# Patient Record
Sex: FEMALE | Race: WHITE | NOT HISPANIC OR LATINO | URBAN - METROPOLITAN AREA
[De-identification: names, ages, dates, MRNs, and addresses within clinical notes are randomized per-mention and may not be internally consistent; named-entity substitution may affect disease eponyms.]

---

## 2022-11-03 ENCOUNTER — OFFICE VISIT (OUTPATIENT)
Dept: URGENT CARE | Facility: CLINIC | Age: 27
End: 2022-11-03

## 2022-11-03 VITALS
DIASTOLIC BLOOD PRESSURE: 79 MMHG | OXYGEN SATURATION: 97 % | BODY MASS INDEX: 23.83 KG/M2 | TEMPERATURE: 98 F | WEIGHT: 122 LBS | RESPIRATION RATE: 18 BRPM | HEART RATE: 96 BPM | SYSTOLIC BLOOD PRESSURE: 132 MMHG

## 2022-11-03 DIAGNOSIS — R00.2 PALPITATIONS: Primary | ICD-10-CM

## 2022-11-03 NOTE — PROGRESS NOTES
[facility]        NAME: Atif Mccabe is a 27 y o  female  : 1995    MRN: [id number]  DATE: November 3, 2022  TIME: 5:37 PM    Assessment and Plan   Palpitations [R00 2]  1  Palpitations  ECG 12 lead     ECG normal   Palpitation and chest discomfort may be anxiety induced  Unable to rule out hiatal hernia or other potential causes  Return precautions given  Patient encouraged to try and see if taking a Xanax would improve her symptoms  Patient Instructions     Follow up with PCP in 3-5 days  Proceed to  ER if symptoms worsen  Chief Complaint     Chief Complaint   Patient presents with   • Chest Pain     Episodes x 2 week, intermittent chest pain, palpitations, left arm numbness/tingling  Left side chest pain, then center of chest,and back   Denies n/v, sob  History of Present Illness     61-year-old female presents today with 2 weeks of chest discomfort associated with left upper extremity discomfort  Specifically reports intermittent palpitations versus chest pain located in the central and left chest   Would develope paresthesias in the left hand and axilla  Experienced this once before about 1 year ago which spontaneously resolved after about 1 month  Reports that her  had a similar case about 1 month ago prior to the onset of her current symptoms  Denies any obvious fevers, chills, respiratory symptoms, abdominal symptoms, dizziness or headaches of the usual   Is under the care of a holistic medical provider regarding her chronic Lyme and EBV infections  Review of Systems   Review of Systems   Constitutional: Negative for chills and fever  Respiratory: Positive for chest tightness  Negative for cough, shortness of breath and wheezing  Cardiovascular: Positive for chest pain  Gastrointestinal: Negative for abdominal pain, diarrhea and nausea  Allergic/Immunologic: Positive for environmental allergies     Neurological: Positive for weakness and numbness  Negative for dizziness and headaches  Psychiatric/Behavioral: The patient is nervous/anxious  Current Medications       Current Outpatient Medications:   •  fluticasone (VERAMYST) 27 5 MCG/SPRAY nasal spray, 2 sprays into each nostril daily, Disp: , Rfl:   •  levocetirizine (XYZAL) 5 MG tablet, Take 5 mg by mouth every evening, Disp: , Rfl:   •  montelukast (SINGULAIR) 10 mg tablet, Take 1 tablet (10 mg total) by mouth daily at bedtime, Disp: 30 tablet, Rfl: 3    Current Allergies     Allergies as of 11/03/2022 - Reviewed 11/03/2022   Allergen Reaction Noted   • Cetirizine Edema, Hives, and Other (See Comments) 03/20/2021            The following portions of the patient's history were reviewed and updated as appropriate: allergies, current medications, past family history, past medical history, past social history, past surgical history and problem list      Past Medical History:   Diagnosis Date   • Personal history of allergy to analgesic agent        Past Surgical History:   Procedure Laterality Date   • BREAST IMPLANT     • BREAST IMPLANT REMOVAL  2020       Family History   Problem Relation Age of Onset   • No Known Problems Mother    • No Known Problems Father          Medications have been verified  Objective   /79   Pulse 96   Temp 98 °F (36 7 °C)   Resp 18   Wt 55 3 kg (122 lb)   LMP 09/30/2022   SpO2 97%   BMI 23 83 kg/m²   Patient's last menstrual period was 09/30/2022  Physical Exam     Physical Exam  Vitals and nursing note reviewed  Constitutional:       General: She is in acute distress  Appearance: Normal appearance  She is well-developed and normal weight  She is not ill-appearing, toxic-appearing or diaphoretic  HENT:      Head: Normocephalic and atraumatic  Eyes:      General:         Right eye: No discharge  Left eye: No discharge  Conjunctiva/sclera: Conjunctivae normal    Cardiovascular:      Heart sounds: Normal heart sounds  Heart sounds not distant  No murmur heard  No systolic murmur is present  No friction rub  Pulmonary:      Effort: Pulmonary effort is normal  No respiratory distress  Breath sounds: Normal breath sounds  No decreased breath sounds, wheezing, rhonchi or rales  Abdominal:      General: Abdomen is flat  Skin:     General: Skin is warm  Findings: No erythema  Neurological:      General: No focal deficit present  Mental Status: She is alert and oriented to person, place, and time  Psychiatric:         Mood and Affect: Mood normal          Behavior: Behavior normal          Thought Content:  Thought content normal          Judgment: Judgment normal

## 2022-11-04 LAB
ATRIAL RATE: 82 BPM
P AXIS: 65 DEGREES
PR INTERVAL: 144 MS
QRS AXIS: 70 DEGREES
QRSD INTERVAL: 70 MS
QT INTERVAL: 354 MS
QTC INTERVAL: 413 MS
T WAVE AXIS: 44 DEGREES
VENTRICULAR RATE: 82 BPM

## 2024-03-13 ENCOUNTER — TELEPHONE (OUTPATIENT)
Age: 29
End: 2024-03-13

## 2024-03-13 NOTE — TELEPHONE ENCOUNTER
PT would like to know if she is able to receive the Tdap vaccine at her appointment on Monday.     Please give her a call back

## 2024-03-18 ENCOUNTER — OFFICE VISIT (OUTPATIENT)
Dept: FAMILY MEDICINE CLINIC | Facility: CLINIC | Age: 29
End: 2024-03-18
Payer: COMMERCIAL

## 2024-03-18 VITALS
BODY MASS INDEX: 30.43 KG/M2 | WEIGHT: 155 LBS | DIASTOLIC BLOOD PRESSURE: 60 MMHG | SYSTOLIC BLOOD PRESSURE: 120 MMHG | HEART RATE: 92 BPM | HEIGHT: 60 IN | OXYGEN SATURATION: 99 % | TEMPERATURE: 98.3 F | RESPIRATION RATE: 16 BRPM

## 2024-03-18 DIAGNOSIS — Z00.00 PHYSICAL EXAM: Primary | ICD-10-CM

## 2024-03-18 DIAGNOSIS — Z23 NEED FOR TDAP VACCINATION: ICD-10-CM

## 2024-03-18 DIAGNOSIS — Z23 ENCOUNTER FOR IMMUNIZATION: ICD-10-CM

## 2024-03-18 DIAGNOSIS — Z34.93 PREGNANT AND NOT YET DELIVERED, THIRD TRIMESTER: ICD-10-CM

## 2024-03-18 PROCEDURE — 90715 TDAP VACCINE 7 YRS/> IM: CPT

## 2024-03-18 PROCEDURE — 99385 PREV VISIT NEW AGE 18-39: CPT | Performed by: FAMILY MEDICINE

## 2024-03-18 PROCEDURE — 90471 IMMUNIZATION ADMIN: CPT

## 2024-03-18 RX ORDER — PNV NO.95/FERROUS FUM/FOLIC AC 28MG-0.8MG
TABLET ORAL
COMMUNITY

## 2024-03-18 NOTE — PROGRESS NOTES
[facility] HEALTH MAINTENANCE OFFICE VISIT  [facility] - [facility]    NAME: Kaylene Garner  AGE: 28 y.o. SEX: female  : 1995     DATE: 3/19/2024    Assessment and Plan     1. Physical exam    2. Pregnant and not yet delivered, third trimester    3. BMI 30.0-30.9,adult    4. Need for Tdap vaccination    5. Encounter for immunization  -     TDAP VACCINE GREATER THAN OR EQUAL TO 6YO IM      Patient Counseling:   Nutrition: Stressed importance of a well balanced diet, moderation of sodium/saturated fat, caloric balance and sufficient intake of fiber  Exercise: Stressed the importance of regular exercise with a goal of 150 minutes per week  Dental Health: Discussed daily flossing and brushing and regular dental visits   Injury Prevention: Discussed Safety Belts, Safety Helmets, and Smoke Detectors    Immunizations reviewed: Risks and Benefits discussed  Discussed benefits of:  Screening labs.  BMI Counseling: Body mass index is 30.27 kg/m².   Pt presently in third trimester pregnancy.    Chief Complaint     Chief Complaint   Patient presents with   • Physical Exam     Tdap vaccine       History of Present Illness     HPI    Well Adult Physical   Patient here for a comprehensive physical exam.  NP in to get established with practice.  In third trimester pregnancy-under care of [facility]--Jerrod Jacob and Traci Gomez--[facility]-+#[phone]  Will be delivering at [facility].  Uses [facility]  Prior physician-functional doctor  Ref to practice by friend    Diet and Physical Activity  Diet: well balanced diet  Exercise: several times per week      Depression Screen  PHQ-2/9 Depression Screening    Little interest or pleasure in doing things: 0 - not at all  Feeling down, depressed, or hopeless: 0 - not at all          General Health  Hearing: Normal:  bilateral  Vision: goes for regular eye exams  Dental: regular dental visits    Reproductive  Health        The following portions of the patient's history were reviewed and updated as appropriate: allergies, current medications, past family history, past medical history, past social history, past surgical history and problem list.    Review of Systems     Review of Systems   Constitutional:  Positive for fatigue.   Respiratory: Negative.     Cardiovascular: Negative.    Gastrointestinal: Negative.    Musculoskeletal: Negative.    Neurological: Negative.    Psychiatric/Behavioral: Negative.         Past Medical History     Past Medical History:   Diagnosis Date   • Personal history of allergy to analgesic agent        Past Surgical History     Past Surgical History:   Procedure Laterality Date   • BREAST IMPLANT     • BREAST IMPLANT REMOVAL         Social History           Family History     Family History   Problem Relation Age of Onset   • No Known Problems Mother    • Heart disease Father    • Dementia Maternal Grandmother    • Cancer Maternal Aunt         ovarian       Current Medications       Current Outpatient Medications:   •  Prenatal Vit-Fe Fumarate-FA (Prenatal Vitamin) 27-0.8 MG TABS, Take by mouth, Disp: , Rfl:      Allergies     Allergies   Allergen Reactions   • Cetirizine Hives and Edema       Objective     /60 (BP Location: Left arm, Patient Position: Sitting, Cuff Size: Standard)   Pulse 92   Temp 98.3 °F (36.8 °C) (Temporal)   Resp 16   Ht 5' (1.524 m)   Wt 70.3 kg (155 lb)   SpO2 99%   BMI 30.27 kg/m²      Physical Exam  Vitals and nursing note reviewed.   Constitutional:       General: She is not in acute distress.     Comments: Pregnant   HENT:      Nose: Nose normal.   Eyes:      General: No scleral icterus.     Conjunctiva/sclera: Conjunctivae normal.   Cardiovascular:      Rate and Rhythm: Normal rate and regular rhythm.   Pulmonary:      Effort: Pulmonary effort is normal. No respiratory distress.      Breath sounds: Normal breath sounds.   Abdominal:       General: Bowel sounds are normal.      Palpations: Abdomen is soft.      Tenderness: There is no abdominal tenderness.   Musculoskeletal:         General: Normal range of motion.      Cervical back: Neck supple. No tenderness.      Right lower leg: No edema.      Left lower leg: No edema.   Skin:     General: Skin is warm and dry.      Coloration: Skin is not jaundiced.   Neurological:      General: No focal deficit present.      Mental Status: She is alert and oriented to person, place, and time.      Cranial Nerves: No cranial nerve deficit.   Psychiatric:         Mood and Affect: Mood normal.           Marian Kemp MD  [facility]

## 2024-03-18 NOTE — PROGRESS NOTES
[facility] HEALTH MAINTENANCE OFFICE VISIT  [facility] - [facility]    NAME: Kaylene Garner  AGE: 28 y.o. SEX: female  : 1995     DATE: 3/18/2024    Assessment and Plan     1. Physical exam    2. Pregnant and not yet delivered, third trimester    3. BMI 30.0-30.9,adult      Patient Counseling:   { Adult CPE Counseling List:75897}    Immunizations reviewed: {LK immunization CPE list:17073}  Discussed benefits of:  {LK Adult CPE Screening counselin}.  BMI Counseling: Body mass index is 30.27 kg/m². Discussed with patient's BMI with her. The BMI {VB BMI Counselin}            Chief Complaint     Chief Complaint   Patient presents with   • Physical Exam     Tdap vaccine       History of Present Illness     HPI    Well Adult Physical   Patient here for a comprehensive physical exam.      Diet and Physical Activity  Diet: {diet; well adult:63502}  Exercise: {exericse; well adult:11218}      Depression Screen  PHQ-2/9 Depression Screening    Little interest or pleasure in doing things: 0 - not at all  Feeling down, depressed, or hopeless: 0 - not at all          General Health  Hearing: {WELL ADULT HEARIN}  Vision: {vision; well adult:05331}  Dental: {dental; well adult:93255}    Reproductive Health  {LK Adult CPE Screening counselin}      The following portions of the patient's history were reviewed and updated as appropriate: allergies, current medications, past family history, past medical history, past social history, past surgical history and problem list.    Review of Systems     Review of Systems    Past Medical History     Past Medical History:   Diagnosis Date   • Personal history of allergy to analgesic agent        Past Surgical History     Past Surgical History:   Procedure Laterality Date   • BREAST IMPLANT     • BREAST IMPLANT REMOVAL         Social History     Social History     Socioeconomic History   • Marital status:  /Civil Union     Spouse name: None   • Number of children: None   • Years of education: None   • Highest education level: None   Occupational History   • None   Tobacco Use   • Smoking status: Never   • Smokeless tobacco: Never   Vaping Use   • Vaping status: Never Used   Substance and Sexual Activity   • Alcohol use: Not Currently   • Drug use: Never   • Sexual activity: Yes   Other Topics Concern   • None   Social History Narrative        What type of home do you live in: Single house    Age of your home: 48    Type of heat: Oil    What type of eliana is in your bedroom: Hardwood floor    Air products: Central air and Air filter    Pets: Dog and Cat    Symptoms caused by pets: itchy eyes    Are pets allowed in bedroom: No    Basement: Damp    Mold:no    Mold location: ***    Mattress covered by allergy cover: no    Pillows covered by allergy cover: yes    Linens and bedding washed in hot water: yes    Exposure to second hand smoke: No        Habits:    Caffeine: {Type:02689}; Amount: cups/day ***, #years ***    Chocolate: ***    Other:     Social Determinants of Health     Financial Resource Strain: Not on file   Food Insecurity: Not on file   Transportation Needs: Not on file   Physical Activity: Not on file   Stress: Not on file   Social Connections: Not on file   Intimate Partner Violence: Not on file   Housing Stability: Not on file       Family History     Family History   Problem Relation Age of Onset   • No Known Problems Mother    • Heart disease Father    • Dementia Maternal Grandmother    • Cancer Maternal Aunt         ovarian       Current Medications       Current Outpatient Medications:   •  Prenatal Vit-Fe Fumarate-FA (Prenatal Vitamin) 27-0.8 MG TABS, Take by mouth, Disp: , Rfl:      Allergies     Allergies   Allergen Reactions   • Cetirizine Hives and Edema       Objective     /60 (BP Location: Left arm, Patient Position: Sitting, Cuff Size: Standard)   Pulse 92   Temp 98.3 °F  (36.8 °C) (Temporal)   Resp 16   Ht 5' (1.524 m)   Wt 70.3 kg (155 lb)   SpO2 99%   BMI 30.27 kg/m²      Physical Exam      No results found.        Marian Kemp MD  [facility]

## 2024-03-18 NOTE — PROGRESS NOTES
[facility] HEALTH MAINTENANCE OFFICE VISIT  [facility] - [facility]    NAME: Kaylene Garner  AGE: 28 y.o. SEX: female  : 1995     DATE: 3/18/2024    Assessment and Plan     1. Physical exam    2. Pregnant and not yet delivered, third trimester    3. BMI 30.0-30.9,adult      Patient Counseling:   { Adult CPE Counseling List:65550}    Immunizations reviewed: {LK immunization CPE list:99358}  Discussed benefits of:  {LK Adult CPE Screening counselin}.  BMI Counseling: Body mass index is 30.27 kg/m². Discussed with patient's BMI with her. The BMI {VB BMI Counselin}    No follow-ups on file.        Chief Complaint     Chief Complaint   Patient presents with   • Physical Exam     Tdap vaccine       History of Present Illness     HPI    Well Adult Physical   Patient here for a comprehensive physical exam.      Diet and Physical Activity  Diet: {diet; well adult:85533}  Exercise: {exericse; well adult:46905}      Depression Screen  PHQ-2/9 Depression Screening    Little interest or pleasure in doing things: 0 - not at all  Feeling down, depressed, or hopeless: 0 - not at all          General Health  Hearing: {WELL ADULT HEARIN}  Vision: {vision; well adult:63227}  Dental: {dental; well adult:43705}    Reproductive Health  {LK Adult CPE Screening counselin}      The following portions of the patient's history were reviewed and updated as appropriate: allergies, current medications, past family history, past medical history, past social history, past surgical history and problem list.    Review of Systems     Review of Systems    Past Medical History     Past Medical History:   Diagnosis Date   • Personal history of allergy to analgesic agent        Past Surgical History     Past Surgical History:   Procedure Laterality Date   • BREAST IMPLANT     • BREAST IMPLANT REMOVAL         Social History     Social History     Socioeconomic History   •  Marital status: /Civil Union     Spouse name: None   • Number of children: None   • Years of education: None   • Highest education level: None   Occupational History   • None   Tobacco Use   • Smoking status: Never   • Smokeless tobacco: Never   Vaping Use   • Vaping status: Never Used   Substance and Sexual Activity   • Alcohol use: Not Currently   • Drug use: Never   • Sexual activity: Yes   Other Topics Concern   • None   Social History Narrative        What type of home do you live in: Single house    Age of your home: 48    Type of heat: Oil    What type of eliana is in your bedroom: Hardwood floor    Air products: Central air and Air filter    Pets: Dog and Cat    Symptoms caused by pets: itchy eyes    Are pets allowed in bedroom: No    Basement: Damp    Mold:no    Mold location: ***    Mattress covered by allergy cover: no    Pillows covered by allergy cover: yes    Linens and bedding washed in hot water: yes    Exposure to second hand smoke: No        Habits:    Caffeine: {Type:66637}; Amount: cups/day ***, #years ***    Chocolate: ***    Other:     Social Determinants of Health     Financial Resource Strain: Not on file   Food Insecurity: Not on file   Transportation Needs: Not on file   Physical Activity: Not on file   Stress: Not on file   Social Connections: Not on file   Intimate Partner Violence: Not on file   Housing Stability: Not on file       Family History     Family History   Problem Relation Age of Onset   • No Known Problems Mother    • Heart disease Father    • Dementia Maternal Grandmother    • Cancer Maternal Aunt         ovarian       Current Medications       Current Outpatient Medications:   •  Prenatal Vit-Fe Fumarate-FA (Prenatal Vitamin) 27-0.8 MG TABS, Take by mouth, Disp: , Rfl:      Allergies     Allergies   Allergen Reactions   • Cetirizine Hives and Edema       Objective     /60 (BP Location: Left arm, Patient Position: Sitting, Cuff Size: Standard)   Pulse 92    Temp 98.3 °F (36.8 °C) (Temporal)   Resp 16   Ht 5' (1.524 m)   Wt 70.3 kg (155 lb)   SpO2 99%   BMI 30.27 kg/m²      Physical Exam      No results found.        Marian Kemp MD  [facility]

## 2024-03-19 PROBLEM — Z23 ENCOUNTER FOR IMMUNIZATION: Status: ACTIVE | Noted: 2024-03-18

## 2024-05-24 ENCOUNTER — NURSE TRIAGE (OUTPATIENT)
Age: 29
End: 2024-05-24

## 2024-05-24 ENCOUNTER — OFFICE VISIT (OUTPATIENT)
Dept: URGENT CARE | Facility: CLINIC | Age: 29
End: 2024-05-24
Payer: COMMERCIAL

## 2024-05-24 VITALS
HEIGHT: 61 IN | BODY MASS INDEX: 25.45 KG/M2 | RESPIRATION RATE: 18 BRPM | TEMPERATURE: 98.8 F | OXYGEN SATURATION: 99 % | SYSTOLIC BLOOD PRESSURE: 110 MMHG | DIASTOLIC BLOOD PRESSURE: 78 MMHG | WEIGHT: 134.8 LBS | HEART RATE: 87 BPM

## 2024-05-24 DIAGNOSIS — Z87.898 H/O LEFT FLANK PAIN: Primary | ICD-10-CM

## 2024-05-24 LAB
SL AMB  POCT GLUCOSE, UA: NEGATIVE
SL AMB LEUKOCYTE ESTERASE,UA: ABNORMAL
SL AMB POCT BILIRUBIN,UA: NEGATIVE
SL AMB POCT BLOOD,UA: ABNORMAL
SL AMB POCT CLARITY,UA: ABNORMAL
SL AMB POCT COLOR,UA: ABNORMAL
SL AMB POCT KETONES,UA: NEGATIVE
SL AMB POCT NITRITE,UA: POSITIVE
SL AMB POCT PH,UA: 7
SL AMB POCT SPECIFIC GRAVITY,UA: 1
SL AMB POCT URINE PROTEIN: ABNORMAL
SL AMB POCT UROBILINOGEN: 0.2

## 2024-05-24 PROCEDURE — 99213 OFFICE O/P EST LOW 20 MIN: CPT | Performed by: FAMILY MEDICINE

## 2024-05-24 PROCEDURE — 87086 URINE CULTURE/COLONY COUNT: CPT | Performed by: FAMILY MEDICINE

## 2024-05-24 PROCEDURE — 81002 URINALYSIS NONAUTO W/O SCOPE: CPT | Performed by: FAMILY MEDICINE

## 2024-05-24 NOTE — PROGRESS NOTES
"  [facility]        NAME: Kaylene Garner is a 28 y.o. female  : 1995    MRN: [id number]  DATE: May 24, 2024  TIME: 3:36 PM    Assessment and Plan   H/O left flank pain [Z87.898]  1. H/O left flank pain  POCT urine dip    Urine culture        Urine dip positive for moderate leukocyte esterase and nitrate though she does not have traditional UTI symptoms.  May indicate an asymptomatic bacteriuria which may have persisted since the pregnancy.  Will obtain a urine culture.  If culture positive, will prescribe an antibiotic that is breast-feeding safe.    Patient Instructions     Follow up with PCP in 3-5 days.  Proceed to  ER if symptoms worsen.    If tests have been performed at [facility], our office will contact you with results if changes need to be made to the care plan discussed with you at the visit.  You can review your full results on [facility] MyChart.    Chief Complaint     Chief Complaint   Patient presents with    Possible UTI     Has been having a \"frothiness\" after voiding in urine x several months during last trimester. Has  24. Continues post partum. Did OTC UTI test - had leokocytes but no nitrites. Has been having occ. L flank pain. Denies fever, urgency/frequency or N/V. Still having minimal post partum bleeding.          History of Present Illness       28-year-old female G1, P1, who presents today with about 2 weeks of left-sided flank pain presents intermittently.  Additionally she reports a frothiness in her urine which has become more frequent.  She is 1 month postpartum via  and denies any pregnancy complications.  Started seeing the frothiness in her third trimester.  Denies any traditional UTI symptoms such as dysuria, increased urinary frequency and urgency.  Denies any fevers or chills.  Of note, baby is doing well; she and the father doing well.  Denies any feelings of baby blues.      Review of Systems   Review of Systems   Constitutional:  " "Negative for chills and fever.   Respiratory:  Negative for shortness of breath.    Cardiovascular:  Negative for chest pain.   Gastrointestinal:  Negative for abdominal pain and nausea.   Genitourinary:  Positive for flank pain (left). Negative for dysuria, frequency and urgency.   Neurological:  Negative for dizziness and headaches.     Current Medications       Current Outpatient Medications:     Prenatal Vit-Fe Fumarate-FA (Prenatal Vitamin) 27-0.8 MG TABS, Take by mouth, Disp: , Rfl:     Current Allergies     Allergies as of 2024 - Reviewed 2024   Allergen Reaction Noted    Cetirizine Hives and Edema 2021            The following portions of the patient's history were reviewed and updated as appropriate: allergies, current medications, past family history, past medical history, past social history, past surgical history and problem list.     Past Medical History:   Diagnosis Date    Personal history of allergy to analgesic agent     Urinary tract infection        Past Surgical History:   Procedure Laterality Date    BREAST IMPLANT      BREAST IMPLANT REMOVAL       SECTION      24       Family History   Problem Relation Age of Onset    No Known Problems Mother     Heart disease Father     Dementia Maternal Grandmother     Cancer Maternal Aunt         ovarian         Medications have been verified.        Objective   /78   Pulse 87   Temp 98.8 °F (37.1 °C)   Resp 18   Ht 5' 1\" (1.549 m)   Wt 61.1 kg (134 lb 12.8 oz)   SpO2 99%   Breastfeeding Yes   BMI 25.47 kg/m²   No LMP recorded.       Physical Exam     Physical Exam  Vitals and nursing note reviewed.   Constitutional:       General: She is in acute distress.      Appearance: Normal appearance. She is normal weight. She is not ill-appearing, toxic-appearing or diaphoretic.   HENT:      Head: Normocephalic and atraumatic.   Eyes:      General:         Right eye: No discharge.         Left eye: No discharge.      " Conjunctiva/sclera: Conjunctivae normal.   Cardiovascular:      Rate and Rhythm: Regular rhythm. Tachycardia present.   Pulmonary:      Effort: Pulmonary effort is normal. No respiratory distress.      Breath sounds: Normal breath sounds. No wheezing, rhonchi or rales.   Abdominal:      Tenderness: There is no abdominal tenderness.   Musculoskeletal:         General: No tenderness.   Neurological:      General: No focal deficit present.      Mental Status: She is alert and oriented to person, place, and time.   Psychiatric:         Mood and Affect: Mood normal.         Behavior: Behavior normal.         Thought Content: Thought content normal.         Judgment: Judgment normal.

## 2024-05-24 NOTE — TELEPHONE ENCOUNTER
"Patient called today due to foamy urine output.  Stated that it started in pregnancy and is now 1 month post partum and is still having it.  Patient denies burning, frequency, foul odor, pain or fever. Patient will go to  to r/o infection.  Patient would like Dr. Kemp's thoughts.  Please advise.     Reason for Disposition   All other urine symptoms    Answer Assessment - Initial Assessment Questions  1. SYMPTOM: \"What's the main symptom you're concerned about?\" (e.g., frequency, incontinence)      Urine is foamy, +leukocytes on home urine test  2. ONSET: \"When did the  foam  start?\"      Started during pregnancy, 1 month post partum .  3. PAIN: \"Is there any pain?\" If Yes, ask: \"How bad is it?\" (Scale: 1-10; mild, moderate, severe)      No pain  4. CAUSE: \"What do you think is causing the symptoms?\"      Possibly a UTI  5. OTHER SYMPTOMS: \"Do you have any other symptoms?\" (e.g., fever, flank pain, blood in urine, pain with urination)      Denies  6. PREGNANCY: \"Is there any chance you are pregnant?\" \"When was your last menstrual period?\"      Post partum 1 month    Protocols used: Urinary Symptoms-ADULT-[place]    "

## 2024-05-24 NOTE — TELEPHONE ENCOUNTER
Regarding: U/A foam in urine  ----- Message from Kandis RG sent at 5/24/2024 12:39 PM EDT -----  Patient is calling stating she has foam in her urine before giving birth and now post pregnancy she stating it is getting worse.  No pain.  I asked if she spoke with her obgyn about this and she stated they did not seem concerned.  [facility]  Can we triage to see if this is something that needs to be seen sooner?

## 2024-05-25 LAB — BACTERIA UR CULT: NORMAL

## 2024-10-30 ENCOUNTER — OFFICE VISIT (OUTPATIENT)
Dept: FAMILY MEDICINE CLINIC | Facility: CLINIC | Age: 29
End: 2024-10-30
Payer: COMMERCIAL

## 2024-10-30 VITALS
RESPIRATION RATE: 14 BRPM | HEIGHT: 61 IN | HEART RATE: 78 BPM | DIASTOLIC BLOOD PRESSURE: 70 MMHG | WEIGHT: 138 LBS | OXYGEN SATURATION: 98 % | BODY MASS INDEX: 26.06 KG/M2 | SYSTOLIC BLOOD PRESSURE: 120 MMHG | TEMPERATURE: 97.9 F

## 2024-10-30 DIAGNOSIS — Z13.0 SCREENING FOR DEFICIENCY ANEMIA: ICD-10-CM

## 2024-10-30 DIAGNOSIS — Z13.1 SCREENING FOR DIABETES MELLITUS: ICD-10-CM

## 2024-10-30 DIAGNOSIS — Z13.29 SCREENING FOR THYROID DISORDER: ICD-10-CM

## 2024-10-30 DIAGNOSIS — Z13.220 SCREENING FOR LIPID DISORDERS: ICD-10-CM

## 2024-10-30 DIAGNOSIS — Z11.59 NEED FOR HEPATITIS C SCREENING TEST: ICD-10-CM

## 2024-10-30 DIAGNOSIS — R91.1 NODULE OF LOWER LOBE OF RIGHT LUNG: ICD-10-CM

## 2024-10-30 DIAGNOSIS — F41.9 ANXIETY: ICD-10-CM

## 2024-10-30 DIAGNOSIS — Z00.00 PHYSICAL EXAM: Primary | ICD-10-CM

## 2024-10-30 LAB
SL AMB  POCT GLUCOSE, UA: NORMAL
SL AMB LEUKOCYTE ESTERASE,UA: NORMAL
SL AMB POCT BILIRUBIN,UA: NORMAL
SL AMB POCT BLOOD,UA: NORMAL
SL AMB POCT CLARITY,UA: CLEAR
SL AMB POCT COLOR,UA: YELLOW
SL AMB POCT KETONES,UA: NORMAL
SL AMB POCT NITRITE,UA: NORMAL
SL AMB POCT PH,UA: 7
SL AMB POCT SPECIFIC GRAVITY,UA: 1
SL AMB POCT URINE PROTEIN: NORMAL
SL AMB POCT UROBILINOGEN: NORMAL

## 2024-10-30 PROCEDURE — 81003 URINALYSIS AUTO W/O SCOPE: CPT | Performed by: FAMILY MEDICINE

## 2024-10-30 PROCEDURE — 99395 PREV VISIT EST AGE 18-39: CPT | Performed by: FAMILY MEDICINE

## 2024-10-30 RX ORDER — ALPRAZOLAM 0.25 MG/1
0.25 TABLET ORAL 2 TIMES DAILY PRN
Qty: 10 TABLET | Refills: 0 | Status: SHIPPED | OUTPATIENT
Start: 2024-10-30

## 2024-10-30 NOTE — PROGRESS NOTES
Adult Annual Physical  Name: Kaylene Garner      : 1995      MRN: [id number]  Encounter Provider: Marian Kemp MD  Encounter Date: 10/30/2024   Encounter department: [facility]    Assessment & Plan  Physical exam    Orders:    CBC; Future    Comprehensive metabolic panel; Future    Hemoglobin A1C; Future    Hepatitis C antibody; Future    Lipase; Future    Lipid Panel with Direct LDL reflex; Future    Magnesium; Future    TSH, 3rd generation; Future    Iron Panel (Includes Ferritin, Iron Sat%, Iron, and TIBC); Future    POCT urine dip auto non-scope    Nodule of lower lobe of right lung    Orders:    CT chest wo contrast; Future    Anxiety    Orders:    ALPRAZolam (XANAX) 0.25 mg tablet; Take 1 tablet (0.25 mg total) by mouth 2 (two) times a day as needed for anxiety or sleep    Screening for lipid disorders         Screening for deficiency anemia    Orders:    CBC; Future    Lipase; Future    Lipid Panel with Direct LDL reflex; Future    Iron Panel (Includes Ferritin, Iron Sat%, Iron, and TIBC); Future    Screening for diabetes mellitus    Orders:    Comprehensive metabolic panel; Future    Hemoglobin A1C; Future    Screening for thyroid disorder    Orders:    TSH, 3rd generation; Future    Need for hepatitis C screening test    Orders:    Hepatitis C antibody; Future    BMI 26.0-26.9,adult         Immunizations and preventive care screenings were discussed with patient today. Appropriate education was printed on patient's after visit summary.    Counseling:  Alcohol/drug use: discussed moderation in alcohol intake, the recommendations for healthy alcohol use, and avoidance of illicit drug use.  Dental Health: discussed importance of regular tooth brushing, flossing, and dental visits.  Injury prevention: discussed safety/seat belts, safety helmets, smoke detectors, carbon monoxide detectors, and smoking near bedding or upholstery.  Sexual health: discussed sexually transmitted  diseases, partner selection, use of condoms, avoidance of unintended pregnancy, and contraceptive alternatives.  Exercise: the importance of regular exercise/physical activity was discussed. Recommend exercise 3-5 times per week for at least 30 minutes.          History of Present Illness     Adult Annual Physical  Review of Systems   Constitutional:  Positive for fatigue.   Respiratory: Negative.     Cardiovascular: Negative.    Gastrointestinal: Negative.    Musculoskeletal: Negative.    Neurological: Negative.    Psychiatric/Behavioral: Negative.       Past Medical History   Past Medical History:   Diagnosis Date    Personal history of allergy to analgesic agent     Urinary tract infection      Past Surgical History:   Procedure Laterality Date    BREAST IMPLANT      BREAST IMPLANT REMOVAL       SECTION      24     Family History   Problem Relation Age of Onset    No Known Problems Mother     Heart disease Father     Dementia Maternal Grandmother     Cancer Maternal Aunt         ovarian     Current Outpatient Medications on File Prior to Visit   Medication Sig Dispense Refill    Prenatal Vit-Fe Fumarate-FA (Prenatal Vitamin) 27-0.8 MG TABS Take by mouth (Patient not taking: Reported on 10/30/2024)       No current facility-administered medications on file prior to visit.     Allergies   Allergen Reactions    Cetirizine Hives and Edema      Current Outpatient Medications on File Prior to Visit   Medication Sig Dispense Refill    Prenatal Vit-Fe Fumarate-FA (Prenatal Vitamin) 27-0.8 MG TABS Take by mouth (Patient not taking: Reported on 10/30/2024)       No current facility-administered medications on file prior to visit.      Social History     Tobacco Use    Smoking status: Never    Smokeless tobacco: Never   Vaping Use    Vaping status: Never Used   Substance and Sexual Activity    Alcohol use: Not Currently    Drug use: Never    Sexual activity: Yes       Immunization History   Administered  "Date(s) Administered    Tdap 03/18/2024       Objective     /70 (BP Location: Left arm, Patient Position: Sitting, Cuff Size: Standard)   Pulse 78   Temp 97.9 °F (36.6 °C) (Temporal)   Resp 14   Ht 5' 1\" (1.549 m)   Wt 62.6 kg (138 lb)   SpO2 98%   BMI 26.07 kg/m²     Physical Exam  Vitals and nursing note reviewed.   Constitutional:       General: She is not in acute distress.     Appearance: Normal appearance.   HENT:      Right Ear: Tympanic membrane normal.      Left Ear: Tympanic membrane normal.      Mouth/Throat:      Mouth: Mucous membranes are moist.      Pharynx: No oropharyngeal exudate or posterior oropharyngeal erythema.   Eyes:      General: No scleral icterus.     Conjunctiva/sclera: Conjunctivae normal.   Cardiovascular:      Rate and Rhythm: Normal rate and regular rhythm.   Pulmonary:      Effort: Pulmonary effort is normal. No respiratory distress.      Breath sounds: Normal breath sounds.   Abdominal:      General: Bowel sounds are normal.      Palpations: Abdomen is soft.      Tenderness: There is no abdominal tenderness.   Musculoskeletal:         General: Normal range of motion.      Cervical back: Neck supple. No tenderness.      Right lower leg: No edema.      Left lower leg: No edema.   Skin:     General: Skin is warm and dry.      Coloration: Skin is not jaundiced.   Neurological:      General: No focal deficit present.      Mental Status: She is alert and oriented to person, place, and time.      Cranial Nerves: No cranial nerve deficit.   Psychiatric:         Mood and Affect: Mood normal.         "

## 2024-11-01 NOTE — ASSESSMENT & PLAN NOTE
Orders:    CBC; Future    Comprehensive metabolic panel; Future    Hemoglobin A1C; Future    Hepatitis C antibody; Future    Lipase; Future    Lipid Panel with Direct LDL reflex; Future    Magnesium; Future    TSH, 3rd generation; Future    Iron Panel (Includes Ferritin, Iron Sat%, Iron, and TIBC); Future    POCT urine dip auto non-scope

## 2024-11-01 NOTE — ASSESSMENT & PLAN NOTE
Orders:    ALPRAZolam (XANAX) 0.25 mg tablet; Take 1 tablet (0.25 mg total) by mouth 2 (two) times a day as needed for anxiety or sleep

## 2024-11-01 NOTE — ASSESSMENT & PLAN NOTE
Orders:    CBC; Future    Lipase; Future    Lipid Panel with Direct LDL reflex; Future    Iron Panel (Includes Ferritin, Iron Sat%, Iron, and TIBC); Future

## 2024-11-08 ENCOUNTER — HOSPITAL ENCOUNTER (OUTPATIENT)
Dept: RADIOLOGY | Facility: HOSPITAL | Age: 29
Discharge: HOME | End: 2024-11-08
Attending: FAMILY MEDICINE
Payer: COMMERCIAL

## 2024-11-08 DIAGNOSIS — R91.1 NODULE OF LOWER LOBE OF RIGHT LUNG: ICD-10-CM

## 2024-11-08 PROCEDURE — 71250 CT THORAX DX C-: CPT

## 2024-11-13 LAB
ALBUMIN SERPL-MCNC: 4.9 G/DL (ref 3.6–5.1)
ALBUMIN/GLOB SERPL: 1.7 (CALC) (ref 1–2.5)
ALP SERPL-CCNC: 58 U/L (ref 31–125)
ALT SERPL-CCNC: 17 U/L (ref 6–29)
AST SERPL-CCNC: 20 U/L (ref 10–30)
BASOPHILS # BLD AUTO: 39 CELLS/UL (ref 0–200)
BASOPHILS NFR BLD AUTO: 0.7 %
BILIRUB SERPL-MCNC: 1.4 MG/DL (ref 0.2–1.2)
BUN SERPL-MCNC: 12 MG/DL (ref 7–25)
BUN/CREAT SERPL: ABNORMAL (CALC) (ref 6–22)
CALCIUM SERPL-MCNC: 9.4 MG/DL (ref 8.6–10.2)
CHLORIDE SERPL-SCNC: 102 MMOL/L (ref 98–110)
CHOLEST SERPL-MCNC: 224 MG/DL
CHOLEST/HDLC SERPL: 3.2 (CALC)
CO2 SERPL-SCNC: 28 MMOL/L (ref 20–32)
CREAT SERPL-MCNC: 0.69 MG/DL (ref 0.5–0.97)
EOSINOPHIL # BLD AUTO: 220 CELLS/UL (ref 15–500)
EOSINOPHIL NFR BLD AUTO: 4 %
ERYTHROCYTE [DISTWIDTH] IN BLOOD BY AUTOMATED COUNT: 13.8 % (ref 11–15)
FERRITIN SERPL-MCNC: 25 NG/ML (ref 16–154)
GFR/BSA.PRED SERPLBLD CYS-BASED-ARV: 120 ML/MIN/1.73M2
GLOBULIN SER CALC-MCNC: 2.9 G/DL (CALC) (ref 1.9–3.7)
GLUCOSE SERPL-MCNC: 95 MG/DL (ref 65–99)
HBA1C MFR BLD: 5.4 % OF TOTAL HGB
HCT VFR BLD AUTO: 41.7 % (ref 35–45)
HCV AB SERPL QL IA: NORMAL
HDLC SERPL-MCNC: 71 MG/DL
HGB BLD-MCNC: 13 G/DL (ref 11.7–15.5)
IRON SATN MFR SERPL: 17 % (CALC) (ref 16–45)
IRON SERPL-MCNC: 68 MCG/DL (ref 40–190)
LDLC SERPL CALC-MCNC: 136 MG/DL (CALC)
LIPASE SERPL-CCNC: 14 U/L (ref 7–60)
LYMPHOCYTES # BLD AUTO: 1678 CELLS/UL (ref 850–3900)
LYMPHOCYTES NFR BLD AUTO: 30.5 %
MAGNESIUM SERPL-MCNC: 2.1 MG/DL (ref 1.5–2.5)
MCH RBC QN AUTO: 27.9 PG (ref 27–33)
MCHC RBC AUTO-ENTMCNC: 31.2 G/DL (ref 32–36)
MCV RBC AUTO: 89.5 FL (ref 80–100)
MONOCYTES # BLD AUTO: 341 CELLS/UL (ref 200–950)
MONOCYTES NFR BLD AUTO: 6.2 %
NEUTROPHILS # BLD AUTO: 3223 CELLS/UL (ref 1500–7800)
NEUTROPHILS NFR BLD AUTO: 58.6 %
NONHDLC SERPL-MCNC: 153 MG/DL (CALC)
PLATELET # BLD AUTO: 281 THOUSAND/UL (ref 140–400)
PMV BLD REES-ECKER: 10.8 FL (ref 7.5–12.5)
POTASSIUM SERPL-SCNC: 4.2 MMOL/L (ref 3.5–5.3)
PROT SERPL-MCNC: 7.8 G/DL (ref 6.1–8.1)
RBC # BLD AUTO: 4.66 MILLION/UL (ref 3.8–5.1)
SODIUM SERPL-SCNC: 136 MMOL/L (ref 135–146)
TIBC SERPL-MCNC: 389 MCG/DL (CALC) (ref 250–450)
TRIGL SERPL-MCNC: 80 MG/DL
TSH SERPL-ACNC: 1.31 MIU/L
WBC # BLD AUTO: 5.5 THOUSAND/UL (ref 3.8–10.8)

## 2024-11-29 PROBLEM — Z13.29 SCREENING FOR THYROID DISORDER: Status: RESOLVED | Noted: 2024-03-18 | Resolved: 2024-11-29

## 2024-11-29 PROBLEM — Z11.59 NEED FOR HEPATITIS C SCREENING TEST: Status: RESOLVED | Noted: 2024-10-30 | Resolved: 2024-11-29

## 2025-01-27 ENCOUNTER — OFFICE VISIT (OUTPATIENT)
Dept: URGENT CARE | Facility: CLINIC | Age: 30
End: 2025-01-27
Payer: COMMERCIAL

## 2025-01-27 VITALS
DIASTOLIC BLOOD PRESSURE: 55 MMHG | BODY MASS INDEX: 26.86 KG/M2 | RESPIRATION RATE: 18 BRPM | HEART RATE: 83 BPM | WEIGHT: 136.8 LBS | OXYGEN SATURATION: 99 % | SYSTOLIC BLOOD PRESSURE: 112 MMHG | TEMPERATURE: 97.4 F | HEIGHT: 60 IN

## 2025-01-27 DIAGNOSIS — J02.9 SORE THROAT: ICD-10-CM

## 2025-01-27 DIAGNOSIS — J06.9 VIRAL UPPER RESPIRATORY TRACT INFECTION: Primary | ICD-10-CM

## 2025-01-27 LAB — S PYO AG THROAT QL: NEGATIVE

## 2025-01-27 PROCEDURE — 87880 STREP A ASSAY W/OPTIC: CPT | Performed by: PHYSICIAN ASSISTANT

## 2025-01-27 PROCEDURE — 99213 OFFICE O/P EST LOW 20 MIN: CPT | Performed by: PHYSICIAN ASSISTANT

## 2025-01-27 NOTE — PROGRESS NOTES
[facility]        NAME: Kaylene Garner is a 29 y.o. female  : 1995    MRN: [id number]  DATE: 2025  TIME: 12:38 PM    Assessment and Plan   Viral upper respiratory tract infection [J06.9]  1. Viral upper respiratory tract infection        2. Sore throat  POCT rapid ANTIGEN strepA        Patient Instructions   Viral upper respiratory infection  Recommend flonase nasal spray for postnasal drip/cough  Warm salt water gargles  Rest, fluids and supportive care  May benefit from a cool mist humidifier on night stand  Tylenol/ibuprofen as needed for pain/fever    Follow up with PCP in 3-5 days.  Proceed to  ER if symptoms worsen.    If tests have been performed at [facility], our office will contact you with results if changes need to be made to the care plan discussed with you at the visit.  [facility]    Chief Complaint     Chief Complaint   Patient presents with    Cough     A week of a cough, and a sore throat.         History of Present Illness       Kaylene is a 29-year-old female who presents to the clinic complaining of sore throat x 3 days.  Also complaining of rhinorrhea, cough, and sneezing.  She states her mother and child were both positive for RSV.  She denies any fever, chills, nasal congestion, ear pain, fatigue, myalgias, shortness of breath, nausea, vomiting, diarrhea, loss of taste or smell, or recent travel.        Review of Systems   Review of Systems   Constitutional:  Negative for chills, fatigue and fever.   HENT:  Positive for rhinorrhea, sneezing and sore throat. Negative for congestion and ear pain.    Respiratory:  Positive for cough. Negative for shortness of breath.    Gastrointestinal:  Negative for diarrhea, nausea and vomiting.   Musculoskeletal:  Negative for myalgias.   Neurological:  Negative for headaches.         Current Medications       Current Outpatient Medications:     ALPRAZolam (XANAX) 0.25 mg tablet,  Take 1 tablet (0.25 mg total) by mouth 2 (two) times a day as needed for anxiety or sleep, Disp: 10 tablet, Rfl: 0    Prenatal Vit-Fe Fumarate-FA (Prenatal Vitamin) 27-0.8 MG TABS, Take by mouth (Patient not taking: Reported on 2025), Disp: , Rfl:     Current Allergies     Allergies as of 2025 - Reviewed 2025   Allergen Reaction Noted    Cetirizine Hives and Edema 2021            The following portions of the patient's history were reviewed and updated as appropriate: allergies, current medications, past family history, past medical history, past social history, past surgical history and problem list.     Past Medical History:   Diagnosis Date    Personal history of allergy to analgesic agent     Urinary tract infection        Past Surgical History:   Procedure Laterality Date    BREAST IMPLANT      BREAST IMPLANT REMOVAL       SECTION      24       Family History   Problem Relation Age of Onset    No Known Problems Mother     Heart disease Father     Dementia Maternal Grandmother     Cancer Maternal Aunt         ovarian         Medications have been verified.        Objective   /55 (BP Location: Left arm, Patient Position: Sitting, Cuff Size: Standard)   Pulse 83   Temp (!) 97.4 °F (36.3 °C) (Temporal)   Resp 18   Ht 5' (1.524 m)   Wt 62.1 kg (136 lb 12.8 oz)   SpO2 99%   BMI 26.72 kg/m²   No LMP recorded.       Physical Exam     Physical Exam  Vitals and nursing note reviewed.   Constitutional:       General: She is not in acute distress.     Appearance: Normal appearance. She is not ill-appearing.   HENT:      Right Ear: Tympanic membrane, ear canal and external ear normal.      Left Ear: Tympanic membrane, ear canal and external ear normal.      Nose: Rhinorrhea present.      Mouth/Throat:      Mouth: Mucous membranes are moist.      Pharynx: No oropharyngeal exudate or posterior oropharyngeal erythema.   Cardiovascular:      Rate and Rhythm: Normal rate and  regular rhythm.      Heart sounds: Normal heart sounds.   Pulmonary:      Effort: Pulmonary effort is normal. No respiratory distress.      Breath sounds: Normal breath sounds. No stridor. No wheezing, rhonchi or rales.   Lymphadenopathy:      Cervical: No cervical adenopathy.   Neurological:      Mental Status: She is alert and oriented to person, place, and time.   Psychiatric:         Mood and Affect: Mood normal.         Behavior: Behavior normal.

## 2025-02-12 ENCOUNTER — NURSE TRIAGE (OUTPATIENT)
Age: 30
End: 2025-02-12

## 2025-02-12 NOTE — TELEPHONE ENCOUNTER
"Regarding: Elevated heart rate  ----- Message from Anna RG sent at 2/12/2025 11:16 AM EST -----  \"My heart rate has been elevated for a few weeks. I have an appt to see my PCP next week.\"    "

## 2025-02-12 NOTE — TELEPHONE ENCOUNTER
"Please advise and call back patient  Patient has an appt next week. Asking if can wait or seen sooner. Concerned with the intermittent shakiness and fluctuating heart rate . Wears an Aura ring that is monitoring her heart rate .   Reason for Disposition   Heart rhythm alert (e.g., 'you have irregular heartbeat') from personal wearable device (e.g., Apple Watch)    Answer Assessment - Initial Assessment Questions  1. DESCRIPTION: \"Please describe your heart rate or heartbeat that you are having\" (e.g., fast/slow, regular/irregular, skipped or extra beats, \"palpitations\")      Fast up to 120 , low when sleeping 65, wears a Aura  ring that monitors her Heart rate   2. ONSET: \"When did it start?\" (e.g., minutes, hours, days)       2 weeks   3. DURATION: \"How long does it last\" (e.g., seconds, minutes, hours)      Elevates on exertion   4. PATTERN \"Does it come and go, or has it been constant since it started?\"  \"Does it get worse with exertion?\"   \"Are you feeling it now?\"      Yes worse in exertion   5. TAP: \"Using your hand, can you tap out what you are feeling on a chair or table in front of you, so that I can hear?\" Note: Not all patients can do this.        N/a   6. HEART RATE: \"Can you tell me your heart rate?\" \"How many beats in 15 seconds?\"  Note: Not all patients can do this.        Resting 70-90, on exertion can elevate to 120.   7. RECURRENT SYMPTOM: \"Have you ever had this before?\" If Yes, ask: \"When was the last time?\" and \"What happened that time?\"       Possibly , \" I am an anxious person \"  8. CAUSE: \"What do you think is causing the palpitations?\"      Unknown   9. CARDIAC HISTORY: \"Do you have any history of heart disease?\" (e.g., heart attack, angina, bypass surgery, angioplasty, arrhythmia)       Denies, father heart attack   10. OTHER SYMPTOMS: \"Do you have any other symptoms?\" (e.g., dizziness, chest pain, sweating, difficulty breathing)        Shakiness , denies dizziness ,   11. PREGNANCY: \"Is " "there any chance you are pregnant?\" \"When was your last menstrual period?\"        Denies    Protocols used: Heart Rate and Heartbeat Questions-Adult-[place]    "

## 2025-02-13 ENCOUNTER — HOSPITAL ENCOUNTER (OUTPATIENT)
Dept: CT IMAGING | Facility: HOSPITAL | Age: 30
Discharge: HOME | End: 2025-02-13
Attending: STUDENT IN AN ORGANIZED HEALTH CARE EDUCATION/TRAINING PROGRAM
Payer: COMMERCIAL

## 2025-02-13 ENCOUNTER — OFFICE VISIT (OUTPATIENT)
Dept: FAMILY MEDICINE CLINIC | Facility: CLINIC | Age: 30
End: 2025-02-13
Payer: COMMERCIAL

## 2025-02-13 VITALS
OXYGEN SATURATION: 99 % | TEMPERATURE: 97.6 F | HEIGHT: 60 IN | DIASTOLIC BLOOD PRESSURE: 80 MMHG | WEIGHT: 137 LBS | SYSTOLIC BLOOD PRESSURE: 102 MMHG | HEART RATE: 109 BPM | BODY MASS INDEX: 26.9 KG/M2 | RESPIRATION RATE: 12 BRPM

## 2025-02-13 DIAGNOSIS — R00.2 HEART PALPITATIONS: ICD-10-CM

## 2025-02-13 DIAGNOSIS — F41.9 ANXIETY: ICD-10-CM

## 2025-02-13 DIAGNOSIS — R06.09 DOE (DYSPNEA ON EXERTION): Primary | ICD-10-CM

## 2025-02-13 DIAGNOSIS — R06.09 DOE (DYSPNEA ON EXERTION): ICD-10-CM

## 2025-02-13 DIAGNOSIS — R00.0 TACHYCARDIA: ICD-10-CM

## 2025-02-13 PROBLEM — Z23 NEED FOR TDAP VACCINATION: Status: RESOLVED | Noted: 2024-03-18 | Resolved: 2025-02-13

## 2025-02-13 PROBLEM — Z34.93 PREGNANT AND NOT YET DELIVERED, THIRD TRIMESTER: Status: RESOLVED | Noted: 2024-03-18 | Resolved: 2025-02-13

## 2025-02-13 PROCEDURE — 93000 ELECTROCARDIOGRAM COMPLETE: CPT | Performed by: STUDENT IN AN ORGANIZED HEALTH CARE EDUCATION/TRAINING PROGRAM

## 2025-02-13 PROCEDURE — 71275 CT ANGIOGRAPHY CHEST: CPT

## 2025-02-13 PROCEDURE — 99214 OFFICE O/P EST MOD 30 MIN: CPT | Performed by: STUDENT IN AN ORGANIZED HEALTH CARE EDUCATION/TRAINING PROGRAM

## 2025-02-13 RX ADMIN — IOHEXOL 85 ML: 350 INJECTION, SOLUTION INTRAVENOUS at 19:12

## 2025-02-13 NOTE — PROGRESS NOTES
Name: Kaylene Garner      : 1995      MRN: [id number]  Encounter Provider: Rogelio Gamez DO  Encounter Date: 2025   Encounter department: [facility]  :  Assessment & Plan  WEST (dyspnea on exertion)  Worsening dyspnea on exertion, heart palpitations, tachycardia, EKG sinus tachycardia without signs of ischemia, intervals appropriate.  Based on clinical symptoms, recommend CTA chest PE study, if negative recommend cardiology evaluation for Holter monitor, further evaluation.  Orders:  •  CTA chest pe study; Future    Heart palpitations    Orders:  •  POCT ECG  •  CTA chest pe study; Future  •  Ambulatory Referral to Cardiology; Future    Anxiety  Xanax as needed, hold at this time until CTA study       Tachycardia  EKG sinus tachycardia              History of Present Illness   Intermittent palpitations with dyspnea on exertion.      Review of Systems   Constitutional:  Negative for chills and fever.   HENT:  Negative for ear pain and sore throat.    Eyes:  Negative for pain and visual disturbance.   Respiratory:  Negative for cough, shortness of breath and wheezing.    Cardiovascular:  Positive for chest pain and palpitations.   Gastrointestinal:  Negative for abdominal pain and vomiting.   Genitourinary:  Negative for dysuria and hematuria.   Musculoskeletal:  Negative for arthralgias and back pain.   Skin:  Negative for color change and rash.   Neurological:  Negative for dizziness, seizures, syncope and headaches.   Psychiatric/Behavioral:  Negative for agitation, behavioral problems and confusion.    All other systems reviewed and are negative.      Objective   /80 (BP Location: Left arm, Patient Position: Sitting, Cuff Size: Standard)   Pulse (!) 109   Temp 97.6 °F (36.4 °C) (Temporal)   Resp 12   Ht 5' (1.524 m)   Wt 62.1 kg (137 lb)   LMP 01/15/2025 (Exact Date)   SpO2 99%   BMI 26.76 kg/m²      Physical Exam  Vitals and nursing note reviewed.    Constitutional:       General: She is not in acute distress.     Appearance: She is well-developed.   HENT:      Head: Normocephalic and atraumatic.   Eyes:      Conjunctiva/sclera: Conjunctivae normal.   Cardiovascular:      Rate and Rhythm: Regular rhythm. Tachycardia present.      Pulses: Normal pulses.      Heart sounds: No murmur heard.  Pulmonary:      Effort: Pulmonary effort is normal. No respiratory distress.      Breath sounds: Normal breath sounds. No rales.   Abdominal:      General: Bowel sounds are normal. There is no distension.      Palpations: Abdomen is soft.      Tenderness: There is no abdominal tenderness.   Musculoskeletal:         General: No swelling. Normal range of motion.      Cervical back: Neck supple.   Skin:     General: Skin is warm and dry.      Capillary Refill: Capillary refill takes less than 2 seconds.   Neurological:      General: No focal deficit present.      Mental Status: She is alert and oriented to person, place, and time. Mental status is at baseline.   Psychiatric:         Mood and Affect: Mood normal.

## 2025-02-14 ENCOUNTER — RESULTS FOLLOW-UP (OUTPATIENT)
Dept: FAMILY MEDICINE CLINIC | Facility: CLINIC | Age: 30
End: 2025-02-14

## 2025-02-14 NOTE — TELEPHONE ENCOUNTER
----- Message from Rogelio Gamez DO sent at 2/14/2025  7:17 AM EST -----  Discussed results with patient, can we see if we can get patient in for cardiology either in [place] or over in [place] in the next few weeks, appreciate the help.

## 2025-02-18 NOTE — TELEPHONE ENCOUNTER
Pt called in stating she prefers to see Dr. Burks but April was the earliest appt. She wants to know if this is ok with Dr. Kemp. She will cancel the appt with Dr. Beltran.

## 2025-02-21 NOTE — TELEPHONE ENCOUNTER
The patient call to report that she received the letter from the insurance that her CT was denied and she would like to know if the provider will appeal the notice. Also the patient wants to know if she needs to wait to April to see the cardiologist or Dr. Gamez can contact them to see if they can see her sooner

## 2025-02-21 NOTE — TELEPHONE ENCOUNTER
----- Message from Rgoelio Gamez DO sent at 2/21/2025  3:20 PM EST -----  Please let patient know that we will appeal CT imaging and get this covered, will discuss with  on Monday on appeal process as we did have all indications to obtain CTA imaging to rule out pulmonary embolism.

## 2025-02-25 ENCOUNTER — TELEPHONE (OUTPATIENT)
Dept: FAMILY MEDICINE CLINIC | Facility: CLINIC | Age: 30
End: 2025-02-25

## 2025-02-25 DIAGNOSIS — R91.1 NODULE OF LOWER LOBE OF RIGHT LUNG: Primary | ICD-10-CM

## 2025-02-25 NOTE — TELEPHONE ENCOUNTER
Luis Fierro,     Did you attach anything to message above? I do not see anything attached to the message.     Ryleigh Nemec MA       ----- Message -----        From:Kaylene Garner        Sent:2/25/2025  1:42 PM EST          To:User Message Message List     Subject:Follow-up CT imaging     Is this what you need?       ----- Message -----        From:Ryleigh N        Sent:2/25/2025  1:22 PM EST          To:Kaylene Garner     Subject:Follow-up CT imaging     Audi Maurice,     Unfortuantly, due to HIPAA regulations we cannot correspond via email with Patients. You can fax the information over to us, send it via [facility] or drop it off at the office. Any questions or concerns please contact us at [phone].     Have a wonderful day!     Ryleigh Nemec MA

## 2025-02-26 NOTE — TELEPHONE ENCOUNTER
Called [facility]udi as we have been trying to fax Dr Gamez's lettter with additional clinical information for ct chest, but fax is not going thru . Spoke with STEPHANY Palmer who advised- ct chest 2/13 was approved, authorization # [id number] for period 2/13- 8/12/2025.    Called patient to advise. She wants to now why new ct chest was ordered yesterday by Dr Kemp. Please advise.

## 2025-02-27 NOTE — TELEPHONE ENCOUNTER
Please advise pt CT chest ordered by me was in error-please disregard.  CT ordered thru Dr. Gamez --normal findings.

## 2025-04-14 ENCOUNTER — OFFICE VISIT (OUTPATIENT)
Dept: CARDIOLOGY CLINIC | Facility: CLINIC | Age: 30
End: 2025-04-14
Payer: COMMERCIAL

## 2025-04-14 ENCOUNTER — TELEPHONE (OUTPATIENT)
Age: 30
End: 2025-04-14

## 2025-04-14 VITALS
BODY MASS INDEX: 26.5 KG/M2 | HEART RATE: 72 BPM | SYSTOLIC BLOOD PRESSURE: 118 MMHG | WEIGHT: 135 LBS | OXYGEN SATURATION: 99 % | HEIGHT: 60 IN | DIASTOLIC BLOOD PRESSURE: 72 MMHG

## 2025-04-14 DIAGNOSIS — Z82.49 FAMILY HISTORY OF PREMATURE CAD: ICD-10-CM

## 2025-04-14 DIAGNOSIS — R00.2 PALPITATIONS: Primary | ICD-10-CM

## 2025-04-14 PROCEDURE — 99203 OFFICE O/P NEW LOW 30 MIN: CPT | Performed by: INTERNAL MEDICINE

## 2025-04-14 PROCEDURE — 93000 ELECTROCARDIOGRAM COMPLETE: CPT | Performed by: INTERNAL MEDICINE

## 2025-04-14 RX ORDER — CRANBERRY FRUIT EXTRACT 200 MG
CAPSULE ORAL
Qty: 60 CAPSULE | Refills: 0 | Status: SHIPPED | COMMUNITY
Start: 2025-04-14

## 2025-04-14 NOTE — TELEPHONE ENCOUNTER
PA for RED YEAST RICE 600 MG SUBMITTED to [employer]    via    []CMM-KEY:   [x]Surescripts-Case ID #   []Availity-Auth ID # NDC #   []Faxed to plan   []Other website   []Phone call Case ID #     []PA sent as URGENT    All office notes, labs and other pertaining documents and studies sent. Clinical questions answered. Awaiting determination from insurance company.     Turnaround time for your insurance to make a decision on your Prior Authorization can take 7-21 business days.

## 2025-04-14 NOTE — PROGRESS NOTES
[facility]  [address] [place] [address] [address]   [place] [address]  Cardiology Consultation    Kaylene Garner  [id number]  1995      Consult for:  Appreciate consult by: Marian Kemp MD    1. Palpitations  POCT ECG    Lipid Panel With Apolipoprotein B (ApoB)    Lipoprotein A (LPA)      2. Family history of premature CAD  Red Yeast Rice Extract 600 MG CAPS    Lipid Panel With Apolipoprotein B (ApoB)    Lipoprotein A (LPA)         Discussion/Summary:   1. Heart palpitations: The patient's heart palpitations may be benign in nature, as the EKG and heart sounds were normal. The palpitations could be stress or lifestyle-related, given no organic cause was identified. The patient's history of palpitations without worrisome symptoms suggests a non-serious etiology.    2. Elevated LDL cholesterol: The patient has a family history of premature heart disease, and current lab results indicate elevated LDL cholesterol. Genetic factors may be contributing to the lipid levels, as the patient maintains a healthy lifestyle. This necessitates targeting LDL levels below 100 mg/dL.    PLAN:  - Treatment:  - Red yeast rice 1200 mg daily for cholesterol management.    - Tests:  - Repeat blood work in six months, including a more extensive cholesterol panel (ApoB and lipoprotein A).    - Patient Education:  - Educated on dietary modifications to target LDL cholesterol below 100 mg/dL.  - Discussed the use of magnesium supplementation for occasional palpitations.    - Follow-Up:  - Follow-up in six months post-blood work to evaluate lipid levels and reassess if further intervention is needed.    - Disposition:  - Continued monitoring of palpitations with instruction to report any increase in frequency or severity, or if associated symptoms like dizziness occur.      HPI:     The patient reported experiencing noticeable and uncomfortable heart palpitations beginning in February. The patient's  heart rate was observed to be over 90 bpm at times, even while at rest. The patient wears an [place]a ring, which indicated a slow elevation in heart rate over a few months. The patient described a significant event where heart flutters occurred all day when bending over, but this resolved on its own. The patient reported no persistent fast heart rhythm or irregular heart rhythm currently. The patient does not report dizziness, lightheadedness, or swelling in the legs. 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 There is no history of smoking, alcohol, or caffeine use. Sleep is currently normal, although previously affected by the heart rate issue. Pertinent negatives include the absence of persistent heart rhythm issues or dizziness.    Family history shows the patient's father had a heart attack at 48 with 100% blockage and is now on a statin. The patient's maternal grandmother has a history of atrial fibrillation. There is no family history of pacemakers.    The patient does not smoke or consume alcohol. The patient follows a healthy diet but acknowledged possible room for improvement in reducing animal fats. The patient is  and has a young child.    PMH  Chest pain episode- Nov 2022- Myocarditis    Social Hx    Travel advisor- inhaled chemicals  Exercise- no limitations  No smoking  No alcohol  Under one year  Sleep ok    Family Hx  Father- MI- 48- nonsmoker-diet  Grandmother- afib  No hx pacemaker    Past Medical History:   Diagnosis Date    Allergic 2012    Spring, summer, fall, cats, tree nuts    Personal history of allergy to analgesic agent     Urinary tract infection           Family History   Problem Relation Age of Onset    Arthritis Mother         Rheumatoid arthritis (potentially brought on by Lyme disease)    Heart disease Father         Heart attack in his 40’s    Cancer Maternal Aunt         ovarian    Cancer Maternal Aunt         Ovarian cancer     Dementia Maternal Grandmother     Atrial fibrillation Maternal Grandmother      Past Surgical History:   Procedure Laterality Date    BREAST IMPLANT      BREAST IMPLANT REMOVAL      BREAST SURGERY  2016 & 2020    Implants in 2016, explant in      SECTION      22    EYE SURGERY  2016    Lasik       Current Outpatient Medications:     ALPRAZolam (XANAX) 0.25 mg tablet, Take 1 tablet (0.25 mg total) by mouth 2 (two) times a day as needed for anxiety or sleep, Disp: 10 tablet, Rfl: 0  Allergies   Allergen Reactions    Cetirizine Hives and Edema     Vitals:    25 1054   BP: 118/72   BP Location: Left arm   Patient Position: Sitting   Cuff Size: Standard   Pulse: 72   SpO2: 99%   Weight: 61.2 kg (135 lb)   Height: 5' (1.524 m)       Review of Systems:   Review of Systems   Constitutional: Negative.  Negative for activity change, appetite change, chills, diaphoresis, fatigue, fever and unexpected weight change.   HENT: Negative.  Negative for congestion, dental problem, drooling, ear discharge, ear pain, facial swelling, hearing loss, mouth sores, nosebleeds, postnasal drip, rhinorrhea, sinus pressure, sinus pain, sneezing, sore throat, tinnitus, trouble swallowing and voice change.    Eyes: Negative.  Negative for photophobia, pain, redness, itching and visual disturbance.   Respiratory: Negative.  Negative for apnea, cough, choking, chest tightness, shortness of breath, wheezing and stridor.    Cardiovascular:  Positive for palpitations. Negative for chest pain and leg swelling.   Gastrointestinal: Negative.  Negative for abdominal distention, abdominal pain, anal bleeding, blood in stool, constipation, diarrhea, nausea, rectal pain and vomiting.   Endocrine: Negative.  Negative for cold intolerance, heat intolerance, polydipsia, polyphagia and polyuria.   Genitourinary: Negative.  Negative for decreased urine volume, difficulty urinating, dyspareunia, dysuria, enuresis, flank pain,  frequency, genital sores, hematuria, menstrual problem, pelvic pain, urgency, vaginal bleeding, vaginal discharge and vaginal pain.   Musculoskeletal: Negative.  Negative for arthralgias, back pain, gait problem, joint swelling, myalgias, neck pain and neck stiffness.   Skin: Negative.  Negative for color change, pallor, rash and wound.   Allergic/Immunologic: Negative.  Negative for environmental allergies, food allergies and immunocompromised state.   Neurological: Negative.  Negative for dizziness, tremors, seizures, syncope, facial asymmetry, speech difficulty, weakness, light-headedness, numbness and headaches.   Hematological: Negative.  Negative for adenopathy. Does not bruise/bleed easily.   Psychiatric/Behavioral: Negative.  Negative for agitation, behavioral problems, confusion, decreased concentration, dysphoric mood, hallucinations, self-injury, sleep disturbance and suicidal ideas. The patient is not nervous/anxious and is not hyperactive.    All other systems reviewed and are negative.      Vitals:    04/14/25 1054   BP: 118/72   BP Location: Left arm   Patient Position: Sitting   Cuff Size: Standard   Pulse: 72   SpO2: 99%   Weight: 61.2 kg (135 lb)   Height: 5' (1.524 m)     Physical Examination:   Physical Exam  Constitutional:       General: She is not in acute distress.     Appearance: She is well-developed. She is not diaphoretic.   HENT:      Head: Normocephalic and atraumatic.      Right Ear: External ear normal.      Left Ear: External ear normal.   Eyes:      General: No scleral icterus.        Right eye: No discharge.         Left eye: No discharge.      Conjunctiva/sclera: Conjunctivae normal.      Pupils: Pupils are equal, round, and reactive to light.   Neck:      Thyroid: No thyromegaly.      Vascular: No JVD.      Trachea: No tracheal deviation.   Cardiovascular:      Rate and Rhythm: Normal rate and regular rhythm.      Heart sounds: No murmur heard.     No friction rub. No gallop.  "  Pulmonary:      Effort: Pulmonary effort is normal. No respiratory distress.      Breath sounds: Normal breath sounds. No stridor. No wheezing or rales.   Chest:      Chest wall: No tenderness.   Abdominal:      General: Bowel sounds are normal. There is no distension.      Palpations: Abdomen is soft. There is no mass.      Tenderness: There is no abdominal tenderness. There is no guarding or rebound.   Musculoskeletal:         General: No tenderness or deformity. Normal range of motion.      Cervical back: Normal range of motion and neck supple.   Skin:     General: Skin is warm and dry.      Coloration: Skin is not pale.      Findings: No erythema or rash.   Neurological:      Mental Status: She is alert and oriented to person, place, and time.      Cranial Nerves: No cranial nerve deficit.      Motor: No abnormal muscle tone.      Coordination: Coordination normal.      Deep Tendon Reflexes: Reflexes are normal and symmetric. Reflexes normal.   Psychiatric:         Behavior: Behavior normal.         Thought Content: Thought content normal.         Judgment: Judgment normal.         Labs:     Lab Results   Component Value Date    WBC 5.5 11/12/2024    HGB 13.0 11/12/2024    HCT 41.7 11/12/2024    MCV 89.5 11/12/2024    RDW 13.8 11/12/2024     11/12/2024     BMP:  Lab Results   Component Value Date    SODIUM 136 11/12/2024    K 4.2 11/12/2024     11/12/2024    CO2 28 11/12/2024    BUN 12 11/12/2024    CREATININE 0.69 11/12/2024    GLUC 95 11/12/2024    CALCIUM 9.4 11/12/2024    EGFR 120 11/12/2024    MG 2.1 11/12/2024     LFT:  Lab Results   Component Value Date    AST 20 11/12/2024    ALT 17 11/12/2024    ALKPHOS 58 11/12/2024    TP 7.8 11/12/2024    ALB 4.9 11/12/2024      No results found for: \"[id number]\"  Lab Results   Component Value Date    HGBA1C 5.4 11/12/2024     Lipid Profile:   Lab Results   Component Value Date    CHOLESTEROL 224 (H) 11/12/2024    HDL 71 11/12/2024    LDLCALC 136 (H) " "11/12/2024    TRIG 80 11/12/2024       Imaging & Testing   I have personally reviewed pertinent reports.      Cardiac Testing         EKG: Personally reviewed.    Normal sinus rhythm no preexcitation no ST-T wave changes normal QTc      Ben Burks MD [facility]  [phone]  Please call with any questions or suggestions    Counseling :  A description of the counseling:   Goals and Barriers:  Patient's ability to self care:  Medication side effect reviewed with patient in detail and all their questions answered.    \"Portions of the record may have been created with voice recognition software. Occasional wrong word or \"sound a like\" substitutions may have occurred due to the inherent limitations of voice recognition software. Read the chart carefully and recognize, using context, where substitutions have occurred. Please call if you have any questions. \"   "

## 2025-04-15 NOTE — TELEPHONE ENCOUNTER
PA for red yeast rice 600 mg EXCLUDED from plan       Reason:(Screenshot if applicable)        Message sent to office clinical pool Yes

## 2025-06-23 ENCOUNTER — OFFICE VISIT (OUTPATIENT)
Dept: FAMILY MEDICINE CLINIC | Facility: CLINIC | Age: 30
End: 2025-06-23
Payer: COMMERCIAL

## 2025-06-23 VITALS
RESPIRATION RATE: 14 BRPM | HEIGHT: 60 IN | OXYGEN SATURATION: 98 % | BODY MASS INDEX: 25.32 KG/M2 | HEART RATE: 74 BPM | DIASTOLIC BLOOD PRESSURE: 72 MMHG | WEIGHT: 129 LBS | TEMPERATURE: 97.9 F | SYSTOLIC BLOOD PRESSURE: 110 MMHG

## 2025-06-23 DIAGNOSIS — F41.8 SITUATIONAL ANXIETY: ICD-10-CM

## 2025-06-23 DIAGNOSIS — M54.12 CERVICAL RADICULOPATHY: Primary | ICD-10-CM

## 2025-06-23 DIAGNOSIS — E78.2 MIXED HYPERLIPIDEMIA: ICD-10-CM

## 2025-06-23 PROCEDURE — 99214 OFFICE O/P EST MOD 30 MIN: CPT | Performed by: FAMILY MEDICINE

## 2025-06-23 RX ORDER — PROPRANOLOL HYDROCHLORIDE 10 MG/1
10 TABLET ORAL DAILY PRN
Qty: 30 TABLET | Refills: 1 | Status: SHIPPED | OUTPATIENT
Start: 2025-06-23

## 2025-06-23 RX ORDER — ALPRAZOLAM 0.25 MG
0.25 TABLET ORAL 2 TIMES DAILY PRN
Qty: 30 TABLET | Refills: 1 | Status: SHIPPED | OUTPATIENT
Start: 2025-06-23

## 2025-06-30 NOTE — PROGRESS NOTES
Name: Kaylene Garner      : 1995      MRN: [id number]  Encounter Provider: Marian Kemp MD  Encounter Date: 2025   Encounter department: [facility]  :  Assessment & Plan  Cervical radiculopathy    Orders:  •  XR spine cervical complete 6+ vw flex/ext/obl; Future  •  Ambulatory referral to Spine & Pain Management; Future  •  Ambulatory Referral to Physical Therapy; Future    Situational anxiety    Orders:  •  propranolol (INDERAL) 10 mg tablet; Take 1 tablet (10 mg total) by mouth daily as needed (situational anxiety)  •  ALPRAZolam (XANAX) 0.25 mg tablet; Take 1 tablet (0.25 mg total) by mouth 2 (two) times a day as needed for anxiety or sleep    Mixed hyperlipidemia  Low chol diet; exercise as tolerated       BMI 25.0-25.9,adult                History of Present Illness   HPI    Follow-up  Multiple concerns  C/o chronic, intermittent neck pain w radicular symptoms  No acute trauma  Occasional h/a--negative CT head   Labs wnl 2024 except tor sllght elevation TC& LDL--HDL good  Palpitations lessened--saw cardio--felt benign-no organic cause identified.  Cont to struggle w anxiety--request med refills for xanax and propanolol--is giving reading at  next weekend and feels is contributing to her increased stress  BP in range        Review of Systems  Per hpi  Objective   /72 (BP Location: Left arm, Patient Position: Sitting, Cuff Size: Standard)   Pulse 74   Temp 97.9 °F (36.6 °C) (Temporal)   Resp 14   Ht 5' (1.524 m)   Wt 58.5 kg (129 lb)   SpO2 98%   BMI 25.19 kg/m²      Physical Exam  Vitals and nursing note reviewed.   Constitutional:       General: She is not in acute distress.     Appearance: Normal appearance.     Eyes:      General: No scleral icterus.     Conjunctiva/sclera: Conjunctivae normal.       Cardiovascular:      Rate and Rhythm: Normal rate and regular rhythm.   Pulmonary:      Effort: Pulmonary effort is normal. No respiratory  distress.      Breath sounds: Normal breath sounds.   Abdominal:      General: Bowel sounds are normal.      Palpations: Abdomen is soft.      Tenderness: There is no abdominal tenderness.     Musculoskeletal:         General: Normal range of motion.      Cervical back: Neck supple. Tenderness (b/l paraspinal/trapezius mm tenderness) present. No rigidity.   Lymphadenopathy:      Cervical: No cervical adenopathy.     Skin:     General: Skin is warm and dry.      Coloration: Skin is not jaundiced.     Neurological:      General: No focal deficit present.      Mental Status: She is alert and oriented to person, place, and time.      Cranial Nerves: No cranial nerve deficit.     Psychiatric:      Comments: Anxious

## 2025-07-03 ENCOUNTER — TELEPHONE (OUTPATIENT)
Dept: PHYSICAL THERAPY | Facility: CLINIC | Age: 30
End: 2025-07-03

## 2025-07-10 ENCOUNTER — TELEPHONE (OUTPATIENT)
Dept: PHYSICAL THERAPY | Facility: CLINIC | Age: 30
End: 2025-07-10

## 2025-07-15 ENCOUNTER — APPOINTMENT (OUTPATIENT)
Dept: RADIOLOGY | Facility: CLINIC | Age: 30
End: 2025-07-15
Attending: FAMILY MEDICINE
Payer: COMMERCIAL

## 2025-07-15 ENCOUNTER — APPOINTMENT (OUTPATIENT)
Dept: RADIOLOGY | Facility: CLINIC | Age: 30
End: 2025-07-15
Payer: COMMERCIAL

## 2025-07-15 DIAGNOSIS — R52 PAIN: ICD-10-CM

## 2025-07-15 DIAGNOSIS — M54.12 CERVICAL RADICULOPATHY: ICD-10-CM

## 2025-07-15 PROCEDURE — 72100 X-RAY EXAM L-S SPINE 2/3 VWS: CPT

## 2025-07-15 PROCEDURE — 72050 X-RAY EXAM NECK SPINE 4/5VWS: CPT

## 2025-07-15 PROCEDURE — 72170 X-RAY EXAM OF PELVIS: CPT

## 2025-08-04 ENCOUNTER — OFFICE VISIT (OUTPATIENT)
Age: 30
End: 2025-08-04
Payer: COMMERCIAL

## 2025-08-04 VITALS — HEIGHT: 60 IN | WEIGHT: 133.6 LBS | BODY MASS INDEX: 26.23 KG/M2

## 2025-08-04 DIAGNOSIS — D22.9 MULTIPLE BENIGN MELANOCYTIC NEVI: ICD-10-CM

## 2025-08-04 DIAGNOSIS — D18.01 CHERRY ANGIOMA: ICD-10-CM

## 2025-08-04 DIAGNOSIS — D23.9 DERMATOFIBROMA: ICD-10-CM

## 2025-08-04 DIAGNOSIS — Z12.83 SCREENING FOR SKIN CANCER: Primary | ICD-10-CM

## 2025-08-04 PROCEDURE — 99203 OFFICE O/P NEW LOW 30 MIN: CPT | Performed by: NURSE PRACTITIONER
